# Patient Record
Sex: MALE | Race: WHITE | NOT HISPANIC OR LATINO | URBAN - METROPOLITAN AREA
[De-identification: names, ages, dates, MRNs, and addresses within clinical notes are randomized per-mention and may not be internally consistent; named-entity substitution may affect disease eponyms.]

---

## 2017-02-07 ENCOUNTER — OFFICE VISIT (OUTPATIENT)
Dept: RETAIL CLINIC | Facility: CLINIC | Age: 22
End: 2017-02-07

## 2017-02-07 DIAGNOSIS — Z02.83 ENCOUNTER FOR DRUG SCREENING: Primary | ICD-10-CM

## 2017-02-07 NOTE — PROGRESS NOTES
Donor presents to clinic for urine drug test. Photo ID and Epassport/Epassport ID number obtained. Consent form signed and dated.     Specimen collected per policy. See [facility] in scanned documents.     Tyesha Muñoz, APRN